# Patient Record
Sex: FEMALE | Race: BLACK OR AFRICAN AMERICAN | Employment: FULL TIME | URBAN - METROPOLITAN AREA
[De-identification: names, ages, dates, MRNs, and addresses within clinical notes are randomized per-mention and may not be internally consistent; named-entity substitution may affect disease eponyms.]

---

## 2017-10-26 ENCOUNTER — HOSPITAL ENCOUNTER (EMERGENCY)
Age: 22
Discharge: HOME | End: 2017-10-26
Attending: EMERGENCY MEDICINE

## 2017-10-26 VITALS
TEMPERATURE: 97.8 F | HEART RATE: 84 BPM | RESPIRATION RATE: 17 BRPM | BODY MASS INDEX: 44.41 KG/M2 | DIASTOLIC BLOOD PRESSURE: 97 MMHG | OXYGEN SATURATION: 100 % | WEIGHT: 293 LBS | HEIGHT: 68 IN | SYSTOLIC BLOOD PRESSURE: 145 MMHG

## 2017-10-26 DIAGNOSIS — R03.0 ELEVATED BLOOD PRESSURE READING: Primary | ICD-10-CM

## 2017-10-26 DIAGNOSIS — B37.9 CANDIDA INFECTION: ICD-10-CM

## 2017-10-26 DIAGNOSIS — B96.89 BV (BACTERIAL VAGINOSIS): ICD-10-CM

## 2017-10-26 DIAGNOSIS — N76.0 BV (BACTERIAL VAGINOSIS): ICD-10-CM

## 2017-10-26 DIAGNOSIS — Z11.3 SCREENING FOR STD (SEXUALLY TRANSMITTED DISEASE): ICD-10-CM

## 2017-10-26 LAB
-: ABNORMAL
AMORPHOUS: ABNORMAL
BACTERIA: ABNORMAL
BILIRUBIN URINE: NEGATIVE
CASTS UA: ABNORMAL /LPF (ref 0–8)
COLOR: YELLOW
COMMENT UA: ABNORMAL
CRYSTALS, UA: ABNORMAL /HPF
DIRECT EXAM: ABNORMAL
EPITHELIAL CELLS UA: ABNORMAL /HPF (ref 0–5)
GLUCOSE URINE: NEGATIVE
HCG(URINE) PREGNANCY TEST: NEGATIVE
KETONES, URINE: NEGATIVE
LEUKOCYTE ESTERASE, URINE: ABNORMAL
Lab: ABNORMAL
MUCUS: ABNORMAL
NITRITE, URINE: NEGATIVE
OTHER OBSERVATIONS UA: ABNORMAL
PH UA: 6.5 (ref 5–8)
PROTEIN UA: NEGATIVE
RBC UA: ABNORMAL /HPF (ref 0–4)
RENAL EPITHELIAL, UA: ABNORMAL /HPF
SPECIFIC GRAVITY UA: 1.02 (ref 1–1.03)
SPECIMEN DESCRIPTION: ABNORMAL
STATUS: ABNORMAL
TRICHOMONAS: ABNORMAL
TURBIDITY: CLEAR
URINE HGB: NEGATIVE
UROBILINOGEN, URINE: NORMAL
WBC UA: ABNORMAL /HPF (ref 0–5)
YEAST: ABNORMAL

## 2017-10-26 PROCEDURE — 99283 EMERGENCY DEPT VISIT LOW MDM: CPT

## 2017-10-26 PROCEDURE — 6370000000 HC RX 637 (ALT 250 FOR IP): Performed by: PHYSICIAN ASSISTANT

## 2017-10-26 PROCEDURE — 81001 URINALYSIS AUTO W/SCOPE: CPT

## 2017-10-26 PROCEDURE — 87480 CANDIDA DNA DIR PROBE: CPT

## 2017-10-26 PROCEDURE — 87591 N.GONORRHOEAE DNA AMP PROB: CPT

## 2017-10-26 PROCEDURE — 6360000002 HC RX W HCPCS: Performed by: PHYSICIAN ASSISTANT

## 2017-10-26 PROCEDURE — 87510 GARDNER VAG DNA DIR PROBE: CPT

## 2017-10-26 PROCEDURE — 87660 TRICHOMONAS VAGIN DIR PROBE: CPT

## 2017-10-26 PROCEDURE — 84703 CHORIONIC GONADOTROPIN ASSAY: CPT

## 2017-10-26 PROCEDURE — 96372 THER/PROPH/DIAG INJ SC/IM: CPT

## 2017-10-26 PROCEDURE — 87491 CHLMYD TRACH DNA AMP PROBE: CPT

## 2017-10-26 RX ORDER — FLUCONAZOLE 150 MG/1
150 TABLET ORAL DAILY
Qty: 1 TABLET | Refills: 0 | Status: SHIPPED | OUTPATIENT
Start: 2017-10-26 | End: 2017-10-27

## 2017-10-26 RX ORDER — FLUCONAZOLE 50 MG/1
150 TABLET ORAL ONCE
Status: COMPLETED | OUTPATIENT
Start: 2017-10-26 | End: 2017-10-26

## 2017-10-26 RX ORDER — CEFTRIAXONE SODIUM 250 MG/1
250 INJECTION, POWDER, FOR SOLUTION INTRAMUSCULAR; INTRAVENOUS ONCE
Status: COMPLETED | OUTPATIENT
Start: 2017-10-26 | End: 2017-10-26

## 2017-10-26 RX ORDER — METRONIDAZOLE 500 MG/1
500 TABLET ORAL 2 TIMES DAILY
Qty: 14 TABLET | Refills: 0 | Status: SHIPPED | OUTPATIENT
Start: 2017-10-26 | End: 2017-11-02

## 2017-10-26 RX ORDER — AZITHROMYCIN 250 MG/1
1000 TABLET, FILM COATED ORAL ONCE
Status: COMPLETED | OUTPATIENT
Start: 2017-10-26 | End: 2017-10-26

## 2017-10-26 RX ADMIN — FLUCONAZOLE 150 MG: 50 TABLET ORAL at 22:22

## 2017-10-26 RX ADMIN — AZITHROMYCIN 1000 MG: 250 TABLET, FILM COATED ORAL at 22:25

## 2017-10-26 RX ADMIN — CEFTRIAXONE SODIUM 250 MG: 250 INJECTION, POWDER, FOR SOLUTION INTRAMUSCULAR; INTRAVENOUS at 22:25

## 2017-10-26 ASSESSMENT — ENCOUNTER SYMPTOMS
EYE PAIN: 0
BACK PAIN: 0
SORE THROAT: 0
RHINORRHEA: 0
COUGH: 0
VOMITING: 0
NAUSEA: 0
EYE DISCHARGE: 0
COLOR CHANGE: 0
WHEEZING: 0
EYE ITCHING: 0

## 2017-10-26 NOTE — LETTER
[facility]  [address] [address]  Phone: [phone]          November 13, 2017      Dear Kristofer Kwan    At the time of your visit to [facility] on , A culture was obtained. It was positive for : gonorrhea and chlamydia  Your sex partner needs to be treated also. You received treatment at the time of your visit. Follow up with your primary care physician is important for your health maintenance. Please make an appointment as soon as possible.        Sincerely,        [facility]

## 2017-10-27 LAB
C TRACH DNA GENITAL QL NAA+PROBE: ABNORMAL
N. GONORRHOEAE DNA: ABNORMAL

## 2017-10-27 NOTE — ED PROVIDER NOTES
[address]  ED  Emergency Department Encounter  Mid Level Provider     Pt Name: Valentino Asher  MRN: [id number]  [place] 1995  Date of evaluation: 10/26/17  PCP:  Conchita De La Rosa DO    CHIEF COMPLAINT       Chief Complaint   Patient presents with    Exposure to STD     Pt states exposure to unknown STD from partner, frequent urination for a week a half w/ irritation       HISTORY OF PRESENT ILLNESS  (Location/Symptom, Timing/Onset, Context/Setting, Quality, Duration, Modifying Factors, Severity.)      Valentino Asher is a 22 y.o. female who presents with Request for her treatment for sexually transmitted diseases. Patient states that her boyfriend was tested last week for gonorrhea and Chlamydia and found out today that he tested positive for both. He did receive treatment today. Patient states that he is her only sexual partner. She does report white vaginal discharge. She denies any dysuria urgency frequency. She denies any pain to her abdomen. She denies a previous history of sexually transmitted disease. Her last menstrual cycle was the first week of October    PAST MEDICAL / SURGICAL / SOCIAL / FAMILY HISTORY      has a past medical history of Anemia; Frequent headaches; and History of blood transfusion. has no past surgical history on file. Social History     Social History    Marital status: Single     Spouse name: N/A    Number of children: N/A    Years of education: N/A     Occupational History    Not on file. Social History Main Topics    Smoking status: Never Smoker    Smokeless tobacco: Not on file    Alcohol use No    Drug use: No    Sexual activity: Not on file     Other Topics Concern    Not on file     Social History Narrative    No narrative on file         Family History   Problem Relation Age of Onset    Heart Disease Mother        Allergies:  Review of patient's allergies indicates no known allergies.     Home Medications:  Prior to INITIAL VITALS:  height is 5' 8\" (1.727 m) and weight is 302 lb (137 kg) (abnormal). Her oral temperature is 97.8 °F (36.6 °C). Her blood pressure is 145/97 (abnormal) and her pulse is 84. Her respiration is 17 and oxygen saturation is 100%. Physical Exam   Constitutional: She is oriented to person, place, and time. She appears well-developed and well-nourished. HENT:   Head: Normocephalic and atraumatic. Right Ear: External ear normal.   Left Ear: External ear normal.   Eyes: Right eye exhibits no discharge. Left eye exhibits no discharge. No scleral icterus. Neck: Normal range of motion. No tracheal deviation present. Cardiovascular: Normal rate, regular rhythm and normal heart sounds. Exam reveals no gallop. No murmur heard. Pulmonary/Chest: Effort normal and breath sounds normal. No stridor. No respiratory distress. Abdominal: There is no tenderness. There is no rebound and no guarding. Genitourinary: Cervix exhibits discharge. Cervix exhibits no motion tenderness. Right adnexum displays no mass and no tenderness. Left adnexum displays no mass and no tenderness. Genitourinary Comments: Patient with a moderate amount of thick white vaginal discharge   Musculoskeletal: Normal range of motion. She exhibits no edema. Neurological: She is alert and oriented to person, place, and time. Coordination normal.   Skin: Skin is warm and dry. No rash (on exposed surfaces) noted. She is not diaphoretic. No pallor. Psychiatric: She has a normal mood and affect.  Her behavior is normal.       DIFFERENTIAL  DIAGNOSIS   Exposure to sexually transmitted disease, vaginitis    PLAN (LABS / IMAGING / EKG):  Orders Placed This Encounter   Procedures    VAGINITIS DNA PROBE    C.trachomatis N.gonorrhoeae DNA    Urinalysis    PREGNANCY, URINE    Microscopic Urinalysis    Vaginal exam    Vital signs       MEDICATIONS ORDERED:  Orders Placed This Encounter   Medications    azithromycin (Darcella Jerry) tablet 1,000 mg    cefTRIAXone (ROCEPHIN) injection 250 mg    fluconazole (DIFLUCAN) tablet 150 mg    metroNIDAZOLE (FLAGYL) 500 MG tablet     Sig: Take 1 tablet by mouth 2 times daily for 7 days     Dispense:  14 tablet     Refill:  0    fluconazole (DIFLUCAN) 150 MG tablet     Sig: Take 1 tablet by mouth daily for 1 dose Take this medication when you complete the flagyl     Dispense:  1 tablet     Refill:  0       Controlled Substances Monitoring:      DIAGNOSTIC RESULTS / EMERGENCY DEPARTMENT COURSE / MDM   Patient with exposure to gonorrhea and Chlamydia, treated today, precautions given, condom use encouraged    Pre-hypertention/Hypertension:  The patient has been informed that they may have pre-hypertension or hypertension based on a blood pressure reading in the emergency Department. I recommend that the patient call the primary care provider listed on the discharge instructions or physician of their choice this week to arrange follow-up for further evaluation of possible pre-hypertension or hypertension    RADIOLOGY:   I directly visualized (with the attending physician) the following  images and reviewed the radiologist interpretations:  No results found. No orders to display       LABS:  Results for orders placed or performed during the hospital encounter of 10/26/17   VAGINITIS DNA PROBE   Result Value Ref Range    Specimen Description . VAGINA     Special Requests NOT REPORTED     Direct Exam POSITIVE for Gardnerella vaginalis. (A)     Direct Exam POSITIVE for Candida sp. (A)     Direct Exam NEGATIVE for Trichomonas vaginalis     Direct Exam       Method of testing is a DNA probe intended for detection and identification of    Direct Exam        Candida species, Gardnerella vaginalis, and Trichomonas vaginalis nucleic acid    Direct Exam        in vaginal fluid specimens from patients with symptoms of vaginitis/vaginosis.     Direct Exam       [facility] [address] [facility], [address] [place] [address] Ky Strickland PA-C   Emergency Medicine Physician Assistant    (Please note that portions of this note were completed with a voice recognition program.  Efforts were made to edit the dictations but occasionally words are mis-transcribed.)       Ky Strickland PA-C  10/26/17 1301

## 2018-02-08 ENCOUNTER — APPOINTMENT (OUTPATIENT)
Dept: GENERAL RADIOLOGY | Age: 23
End: 2018-02-08
Payer: MEDICARE

## 2018-02-08 ENCOUNTER — HOSPITAL ENCOUNTER (EMERGENCY)
Age: 23
Discharge: HOME | End: 2018-02-08
Attending: EMERGENCY MEDICINE
Payer: MEDICARE

## 2018-02-08 VITALS
BODY MASS INDEX: 43.4 KG/M2 | HEIGHT: 69 IN | RESPIRATION RATE: 18 BRPM | WEIGHT: 293 LBS | SYSTOLIC BLOOD PRESSURE: 146 MMHG | DIASTOLIC BLOOD PRESSURE: 87 MMHG | TEMPERATURE: 97.6 F | OXYGEN SATURATION: 100 % | HEART RATE: 60 BPM

## 2018-02-08 DIAGNOSIS — S16.1XXA STRAIN OF NECK MUSCLE, INITIAL ENCOUNTER: Primary | ICD-10-CM

## 2018-02-08 DIAGNOSIS — R20.2 PARESTHESIA: ICD-10-CM

## 2018-02-08 LAB
CHP ED QC CHECK: NORMAL
EKG ATRIAL RATE: 53 BPM
EKG P AXIS: -16 DEGREES
EKG P-R INTERVAL: 162 MS
EKG Q-T INTERVAL: 404 MS
EKG QRS DURATION: 96 MS
EKG QTC CALCULATION (BAZETT): 379 MS
EKG R AXIS: -13 DEGREES
EKG T AXIS: -13 DEGREES
EKG VENTRICULAR RATE: 53 BPM
PREGNANCY TEST URINE, POC: NORMAL

## 2018-02-08 PROCEDURE — 6370000000 HC RX 637 (ALT 250 FOR IP): Performed by: NURSE PRACTITIONER

## 2018-02-08 PROCEDURE — 81003 URINALYSIS AUTO W/O SCOPE: CPT

## 2018-02-08 PROCEDURE — 6360000002 HC RX W HCPCS: Performed by: NURSE PRACTITIONER

## 2018-02-08 PROCEDURE — 84703 CHORIONIC GONADOTROPIN ASSAY: CPT

## 2018-02-08 PROCEDURE — 99284 EMERGENCY DEPT VISIT MOD MDM: CPT

## 2018-02-08 PROCEDURE — 72040 X-RAY EXAM NECK SPINE 2-3 VW: CPT

## 2018-02-08 PROCEDURE — 93005 ELECTROCARDIOGRAM TRACING: CPT

## 2018-02-08 RX ORDER — NAPROXEN 500 MG/1
500 TABLET ORAL 2 TIMES DAILY
Qty: 20 TABLET | Refills: 0 | Status: SHIPPED | OUTPATIENT
Start: 2018-02-08 | End: 2019-08-14

## 2018-02-08 RX ORDER — ACETAMINOPHEN AND CODEINE PHOSPHATE 300; 30 MG/1; MG/1
1 TABLET ORAL EVERY 6 HOURS PRN
Qty: 10 TABLET | Refills: 0 | Status: SHIPPED | OUTPATIENT
Start: 2018-02-08 | End: 2018-02-15

## 2018-02-08 RX ORDER — ONDANSETRON 4 MG/1
4 TABLET, ORALLY DISINTEGRATING ORAL ONCE
Status: COMPLETED | OUTPATIENT
Start: 2018-02-08 | End: 2018-02-08

## 2018-02-08 RX ORDER — METAXALONE 800 MG/1
800 TABLET ORAL ONCE
Status: COMPLETED | OUTPATIENT
Start: 2018-02-08 | End: 2018-02-08

## 2018-02-08 RX ORDER — BUTALBITAL, ACETAMINOPHEN AND CAFFEINE 50; 325; 40 MG/1; MG/1; MG/1
2 TABLET ORAL ONCE
Status: COMPLETED | OUTPATIENT
Start: 2018-02-08 | End: 2018-02-08

## 2018-02-08 RX ORDER — METHOCARBAMOL 750 MG/1
750 TABLET, FILM COATED ORAL 3 TIMES DAILY
Qty: 30 TABLET | Refills: 0 | Status: SHIPPED | OUTPATIENT
Start: 2018-02-08 | End: 2019-08-14

## 2018-02-08 RX ADMIN — ONDANSETRON 4 MG: 4 TABLET, ORALLY DISINTEGRATING ORAL at 15:05

## 2018-02-08 RX ADMIN — BUTALBITAL, ACETAMINOPHEN, AND CAFFEINE 2 TABLET: 50; 325; 40 TABLET ORAL at 15:04

## 2018-02-08 RX ADMIN — METAXALONE 800 MG: 800 TABLET ORAL at 15:04

## 2018-02-08 ASSESSMENT — PAIN DESCRIPTION - ORIENTATION: ORIENTATION: LEFT

## 2018-02-08 ASSESSMENT — PAIN DESCRIPTION - DESCRIPTORS: DESCRIPTORS: NUMBNESS;ACHING

## 2018-02-08 ASSESSMENT — PAIN SCALES - GENERAL: PAINLEVEL_OUTOF10: 7

## 2018-02-08 ASSESSMENT — PAIN DESCRIPTION - LOCATION: LOCATION: ARM;HEAD

## 2018-02-08 NOTE — ED PROVIDER NOTES
Relation Status    Mother         SOCIAL HISTORY      reports that she has never smoked. She has never used smokeless tobacco. She reports that she does not drink alcohol or use drugs. REVIEW OF SYSTEMS    (2-9 systems for level 4, 10 or more for level 5)      Review of Systems   Constitutional: Negative for chills, diaphoresis, fatigue and fever. Eyes: Negative for photophobia, pain and visual disturbance. Respiratory: Negative for cough and shortness of breath. Cardiovascular: Negative for chest pain. Gastrointestinal: Negative for abdominal pain, diarrhea, nausea and vomiting. Musculoskeletal: Positive for arthralgias, myalgias and neck pain. Negative for back pain. Skin: Negative for color change, rash and wound. Neurological: Positive for numbness and headaches. Negative for dizziness, syncope, facial asymmetry, speech difficulty, weakness and light-headedness. Psychiatric/Behavioral: Negative for confusion. Except as noted above the remainder of the review of systems was reviewed and negative. PHYSICAL EXAM    (up to 7 for level 4, 8 or more for level 5)     ED Triage Vitals [02/08/18 1314]   BP Temp Temp Source Pulse Resp SpO2 Height Weight   (!) 146/87 97.6 °F (36.4 °C) Oral 60 18 100 % 5' 9\" (1.753 m) (!) 311 lb (141.1 kg)     Physical Exam   Constitutional: She is oriented to person, place, and time. She appears well-developed and well-nourished. No distress. HENT:   Mouth/Throat: Oropharynx is clear and moist.   Eyes: Conjunctivae and EOM are normal. Pupils are equal, round, and reactive to light. Neck: Normal range of motion. Neck supple. Cardiovascular: Normal rate, regular rhythm and normal heart sounds. Pulmonary/Chest: Effort normal and breath sounds normal. No respiratory distress. She has no wheezes. She has no rales. Abdominal: Soft. She exhibits no distension. There is no tenderness.    Musculoskeletal:        Left shoulder: She exhibits normal range of relate to patient position or muscle spasm. No acute fracture, subluxation, compression deformity or suspicious osseous lesions are identified. The dens process appears intact and the lateral masses of C1 are well aligned with C2. Intervertebral disc spaces and facet joint spaces appear reasonably well preserved. There is no prevertebral soft tissue thickening. The visualized lung parenchyma appears well aerated. No radiographic evidence for acute osseous abnormality. LABS:  Labs Reviewed   POCT URINE PREGNANCY - Normal       All other labs were within normal range or not returned as of this dictation. EMERGENCY DEPARTMENT COURSE and DIFFERENTIAL DIAGNOSIS/MDM:   Vitals:    Vitals:    02/08/18 1314   BP: (!) 146/87   Pulse: 60   Resp: 18   Temp: 97.6 °F (36.4 °C)   TempSrc: Oral   SpO2: 100%   Weight: (!) 311 lb (141.1 kg)   Height: 5' 9\" (1.753 m)         MEDICATIONS GIVEN IN THE ED:  Medications   ondansetron (ZOFRAN-ODT) disintegrating tablet 4 mg (4 mg Oral Given 2/8/18 1505)   butalbital-acetaminophen-caffeine (FIORICET, ESGIC) per tablet 2 tablet (2 tablets Oral Given 2/8/18 1504)   metaxalone (SKELAXIN) tablet 800 mg (800 mg Oral Given 2/8/18 1504)       CLINICAL DECISION MAKING:  The patient presented alert with a nontoxic appearance and was seen in conjunction with Dr. Cherelle Lin. Urine dipstick was unremarkable. Imaging was negative for acute findings. A ride was present. Prescriptions were written for naprosyn, robaxin, and tylenol #3. The patient was advised to not drink alcohol, drive, or operate heavy machinery while taking the robaxin and/or tylenol #3. Follow up with pcp, return to ED if condition worsens. FINAL IMPRESSION      1. Strain of neck muscle, initial encounter    2.  Paresthesia            Problem List  Patient Active Problem List   Diagnosis Code    AUB O vs E N93.9    BMI 46 Z68.42    Iron deficiency anemia D50.9    Anemia D64.9    Menometrorrhagia N92.1 DISPOSITION/PLAN   DISPOSITION Decision To Discharge 02/08/2018 03:37:05 PM      PATIENT REFERRED TO:   Judith Fraser, DO  [address] [id number]    Schedule an appointment as soon as possible for a visit in 2 days      [facility] ED  [address]  [phone]    If symptoms worsen      DISCHARGE MEDICATIONS:     Discharge Medication List as of 2/8/2018  3:47 PM      START taking these medications    Details   methocarbamol (ROBAXIN-750) 750 MG tablet Take 1 tablet by mouth 3 times daily   WARNING:  May cause drowsiness.  May impair ability to operate vehicles or machinery.  Do not use in combination with alcohol., Disp-30 tablet, R-0Print      acetaminophen-codeine (TYLENOL/CODEINE #3) 300-30 MG per tablet Take 1 tablet by mouth every 6 hours as needed for Pain (WARNING:  May cause drowsiness.  May impair ability to operate vehicles or machinery.  Do not use in combination with alcohol.) for up to 7 days. , Disp-10 tablet, R-0Print      naproxen (NAPROSYN) 500 MG tablet Take 1 tablet by mouth 2 times daily, Disp-20 tablet, R-0Print                 (Please note that portions of this note were completed with a voice recognition program.  Efforts were made to edit the dictations but occasionally words are mis-transcribed.)    AURORA Gonzalez NP  02/09/18 0100

## 2018-02-09 LAB — HCG, PREGNANCY URINE (POC): NEGATIVE

## 2018-02-09 ASSESSMENT — ENCOUNTER SYMPTOMS
PHOTOPHOBIA: 0
DIARRHEA: 0
SHORTNESS OF BREATH: 0
ABDOMINAL PAIN: 0
COLOR CHANGE: 0
BACK PAIN: 0
EYE PAIN: 0
COUGH: 0
VOMITING: 0
NAUSEA: 0

## 2018-08-02 PROBLEM — R45.851 SUICIDAL IDEATION: Status: ACTIVE | Noted: 2018-08-02

## 2019-09-03 PROBLEM — R56.9 SEIZURE-LIKE ACTIVITY (HCC): Status: ACTIVE | Noted: 2019-09-03

## 2019-09-04 PROBLEM — R20.0 LEFT SIDED NUMBNESS: Status: ACTIVE | Noted: 2019-09-04

## 2019-09-04 PROBLEM — R53.1 LEFT-SIDED WEAKNESS: Status: ACTIVE | Noted: 2019-09-04

## 2019-09-04 PROBLEM — R06.83 SNORING: Status: ACTIVE | Noted: 2019-09-04

## 2019-09-04 PROBLEM — F31.9 BIPOLAR 1 DISORDER (HCC): Status: ACTIVE | Noted: 2019-09-04

## 2019-10-17 PROBLEM — F33.9 MAJOR DEPRESSION, RECURRENT (HCC): Status: ACTIVE | Noted: 2018-02-12

## 2019-10-17 PROBLEM — R29.818 NEUROLOGICAL FINDING: Status: ACTIVE | Noted: 2019-09-03

## 2019-10-17 PROBLEM — E66.01 CLASS 3 SEVERE OBESITY WITHOUT SERIOUS COMORBIDITY IN ADULT (HCC): Status: ACTIVE | Noted: 2019-10-17

## 2019-10-17 PROBLEM — F31.9 BIPOLAR I DISORDER (HCC): Status: ACTIVE | Noted: 2019-04-06

## 2019-10-17 PROBLEM — F44.5 PSYCHOGENIC NONEPILEPTIC SEIZURE: Status: ACTIVE | Noted: 2019-10-17

## 2019-10-17 PROBLEM — G81.94 LEFT HEMIPARESIS (HCC): Status: ACTIVE | Noted: 2019-09-04

## 2019-10-17 PROBLEM — R45.851 SUICIDAL IDEATION: Status: RESOLVED | Noted: 2018-08-02 | Resolved: 2019-10-17

## 2019-10-17 PROBLEM — R51.9 HEADACHE: Status: ACTIVE | Noted: 2019-06-25

## 2019-10-17 PROBLEM — G81.94 LEFT HEMIPARESIS (HCC): Status: RESOLVED | Noted: 2019-09-04 | Resolved: 2019-10-17

## 2020-05-08 PROBLEM — R20.0 NUMBNESS: Status: RESOLVED | Noted: 2019-09-04 | Resolved: 2020-05-08

## 2020-05-08 PROBLEM — R29.818 NEUROLOGICAL FINDING: Status: RESOLVED | Noted: 2019-09-03 | Resolved: 2020-05-08

## 2020-05-08 PROBLEM — R51.9 HEADACHE: Status: RESOLVED | Noted: 2019-06-25 | Resolved: 2020-05-08

## 2020-05-08 PROBLEM — E28.2 POLYCYSTIC OVARIES: Status: ACTIVE | Noted: 2020-01-07

## 2020-05-08 PROBLEM — E55.9 VITAMIN D DEFICIENCY: Status: ACTIVE | Noted: 2020-05-08

## 2020-05-08 PROBLEM — R06.83 SNORING: Status: RESOLVED | Noted: 2019-09-04 | Resolved: 2020-05-08

## 2021-03-24 PROBLEM — R05.8 ALLERGIC COUGH: Status: ACTIVE | Noted: 2021-03-24

## 2021-03-24 PROBLEM — J06.9 VIRAL UPPER RESPIRATORY TRACT INFECTION: Status: ACTIVE | Noted: 2021-03-24

## 2021-08-09 PROBLEM — R63.5 UNINTENDED WEIGHT GAIN: Status: ACTIVE | Noted: 2021-08-09

## 2021-08-09 PROBLEM — K59.00 CONSTIPATION: Status: ACTIVE | Noted: 2021-08-09

## 2021-08-09 PROBLEM — K92.1 HEMATOCHEZIA: Status: ACTIVE | Noted: 2021-08-09

## 2021-08-19 PROBLEM — J06.9 VIRAL UPPER RESPIRATORY TRACT INFECTION: Status: RESOLVED | Noted: 2021-03-24 | Resolved: 2021-08-19

## 2021-08-19 PROBLEM — F44.5 PSYCHOGENIC NONEPILEPTIC SEIZURE: Status: RESOLVED | Noted: 2019-10-17 | Resolved: 2021-08-19

## 2021-09-20 PROBLEM — Z11.59 NEED FOR HEPATITIS C SCREENING TEST: Status: ACTIVE | Noted: 2021-09-20

## 2021-09-20 PROBLEM — G44.209 TENSION HEADACHE: Status: ACTIVE | Noted: 2021-09-20

## 2021-10-20 PROBLEM — Z11.59 NEED FOR HEPATITIS C SCREENING TEST: Status: RESOLVED | Noted: 2021-09-20 | Resolved: 2021-10-20

## 2021-10-29 PROBLEM — G43.011 INTRACTABLE MIGRAINE WITHOUT AURA AND WITH STATUS MIGRAINOSUS: Status: ACTIVE | Noted: 2021-10-29

## 2021-11-05 PROBLEM — E66.01 MORBID OBESITY DUE TO EXCESS CALORIES (HCC): Status: ACTIVE | Noted: 2021-11-05

## 2021-12-24 PROBLEM — Z72.821 POOR SLEEP HYGIENE: Status: ACTIVE | Noted: 2021-12-24

## 2021-12-24 PROBLEM — Z76.0 MEDICATION REFILL: Status: ACTIVE | Noted: 2021-12-24

## 2022-07-13 PROBLEM — R07.9 CHEST PAIN: Status: ACTIVE | Noted: 2022-07-13

## 2022-07-13 PROBLEM — R06.02 SHORTNESS OF BREATH: Status: ACTIVE | Noted: 2022-07-13

## 2022-07-13 PROBLEM — R35.0 URINARY FREQUENCY: Status: ACTIVE | Noted: 2022-07-13

## 2022-07-13 PROBLEM — R09.89 PULMONARY VASCULAR CONGESTION: Status: ACTIVE | Noted: 2022-07-13

## 2022-07-22 PROBLEM — A59.01 TRICHOMONAL VULVOVAGINITIS: Status: ACTIVE | Noted: 2018-11-24

## 2022-07-22 PROBLEM — M67.40 GANGLION CYST: Status: ACTIVE | Noted: 2022-07-22

## 2022-07-22 PROBLEM — I10 HTN (HYPERTENSION): Status: ACTIVE | Noted: 2022-07-22

## 2022-07-22 PROBLEM — T14.91XA SUICIDE ATTEMPT, INITIAL ENCOUNTER (HCC): Status: ACTIVE | Noted: 2018-02-11

## 2022-07-22 PROBLEM — Z76.0 MEDICATION REFILL: Status: RESOLVED | Noted: 2021-12-24 | Resolved: 2022-07-22

## 2022-07-22 PROBLEM — J32.8 OTHER CHRONIC SINUSITIS: Status: RESOLVED | Noted: 2019-08-22 | Resolved: 2022-07-22

## 2022-07-22 PROBLEM — J32.8 OTHER CHRONIC SINUSITIS: Status: ACTIVE | Noted: 2019-08-22

## 2022-07-22 PROBLEM — R07.9 CHEST PAIN: Status: RESOLVED | Noted: 2022-07-13 | Resolved: 2022-07-22

## 2022-07-22 PROBLEM — R63.5 UNINTENDED WEIGHT GAIN: Status: RESOLVED | Noted: 2021-08-09 | Resolved: 2022-07-22

## 2022-08-11 PROBLEM — N10 PYELONEPHRITIS, ACUTE: Status: ACTIVE | Noted: 2022-08-11

## 2022-08-11 PROBLEM — N30.90 CYSTITIS: Status: ACTIVE | Noted: 2022-08-11

## 2022-08-11 PROBLEM — R60.0 LEG EDEMA, LEFT: Status: ACTIVE | Noted: 2022-08-11

## 2022-08-11 PROBLEM — K21.9 GASTROESOPHAGEAL REFLUX DISEASE: Status: ACTIVE | Noted: 2022-08-11

## 2022-08-11 PROBLEM — I87.2 EDEMA OF LOWER EXTREMITY DUE TO PERIPHERAL VENOUS INSUFFICIENCY: Status: ACTIVE | Noted: 2022-08-11

## 2022-08-11 PROBLEM — R30.0 DYSURIA: Status: ACTIVE | Noted: 2022-08-11

## 2022-08-11 PROBLEM — Z72.0 TOBACCO ABUSE: Status: ACTIVE | Noted: 2022-08-11

## 2022-08-11 PROBLEM — M25.472 LEFT ANKLE SWELLING: Status: ACTIVE | Noted: 2022-08-11
